# Patient Record
Sex: FEMALE | Race: WHITE | Employment: FULL TIME | URBAN - METROPOLITAN AREA
[De-identification: names, ages, dates, MRNs, and addresses within clinical notes are randomized per-mention and may not be internally consistent; named-entity substitution may affect disease eponyms.]

---

## 2017-08-16 ENCOUNTER — HOSPITAL ENCOUNTER (OUTPATIENT)
Dept: LAB | Age: 22
Discharge: HOME | End: 2017-08-16

## 2017-08-16 ENCOUNTER — HOSPITAL ENCOUNTER (EMERGENCY)
Age: 22
Discharge: HOME | End: 2017-08-16
Attending: FAMILY MEDICINE

## 2017-08-16 VITALS
SYSTOLIC BLOOD PRESSURE: 131 MMHG | OXYGEN SATURATION: 96 % | HEIGHT: 63 IN | HEART RATE: 93 BPM | RESPIRATION RATE: 20 BRPM | WEIGHT: 265 LBS | TEMPERATURE: 97.4 F | BODY MASS INDEX: 46.95 KG/M2 | DIASTOLIC BLOOD PRESSURE: 78 MMHG

## 2017-08-16 DIAGNOSIS — N89.8 VAGINAL ITCHING: Primary | ICD-10-CM

## 2017-08-16 LAB
BILIRUB UR QL: NEGATIVE
GLUCOSE UR QL STRIP.AUTO: NEGATIVE MG/DL
HCG UR QL: NEGATIVE
KETONES UR-MCNC: NEGATIVE MG/DL
LEUKOCYTE ESTERASE UR QL STRIP: ABNORMAL
NITRITE UR QL: NEGATIVE
PH UR: 6.5 [PH] (ref 5–8)
PROT UR QL: ABNORMAL MG/DL
RBC # UR STRIP: ABNORMAL /UL
SP GR UR: 1.02 (ref 1–1.03)
UROBILINOGEN UR QL: 0.2 EU/DL (ref 0.2–1)

## 2017-08-16 PROCEDURE — 87591 N.GONORRHOEAE DNA AMP PROB: CPT | Performed by: FAMILY MEDICINE

## 2017-08-16 RX ORDER — FLUCONAZOLE 150 MG/1
150 TABLET ORAL
Qty: 1 TAB | Refills: 0 | Status: SHIPPED | OUTPATIENT
Start: 2017-08-16 | End: 2017-08-16

## 2017-08-16 RX ORDER — METRONIDAZOLE 7.5 MG/G
1 GEL VAGINAL
Qty: 187.5 MG | Refills: 0 | Status: SHIPPED | OUTPATIENT
Start: 2017-08-16 | End: 2017-08-21

## 2017-08-16 NOTE — DISCHARGE INSTRUCTIONS

## 2017-08-16 NOTE — UC PROVIDER NOTE
Patient is a 24 y.o. female presenting with vaginal itching. The history is provided by the patient. Vaginal Itching    This is a new problem. The current episode started more than 2 days ago. The problem occurs daily. The problem has not changed since onset. The discharge occurs after intercourse. Vaginal discharge characteristics: no discharge. She is not pregnant. She has not missed her period. Associated symptoms include genital itching. Pertinent negatives include no fever, no abdominal pain, no dyspareunia, no dysuria, no frequency, no genital burning, no genital lesions, no perineal pain, no perineal odor and no painful intercourse. Treatments tried: otc vaginal yeast cream x 7 days. The treatment provided mild relief. Her past medical history does not include irregular periods, STD or ectopic pregnancy. Past Medical History:   Diagnosis Date    Morbid obesity ([address]     Unspecified adverse effect of anesthesia     mother states she's had \"trouble breathing after procedure\"        History reviewed. No pertinent surgical history. History reviewed. No pertinent family history. Social History     Social History    Marital status: SINGLE     Spouse name: N/A    Number of children: N/A    Years of education: N/A     Occupational History    Not on file. Social History Main Topics    Smoking status: Current Every Day Smoker     Packs/day: 1.00    Smokeless tobacco: Never Used    Alcohol use No    Drug use: No    Sexual activity: Not on file     Other Topics Concern    Not on file     Social History Narrative                ALLERGIES: Keflex [cephalexin]    Review of Systems   Constitutional: Negative for fever. Gastrointestinal: Negative for abdominal pain. Genitourinary: Negative for dyspareunia, dysuria and frequency. All other systems reviewed and are negative.       Vitals:    08/16/17 1428 08/16/17 1430   BP:  131/78   Pulse:  93   Resp:  20   Temp:  97.4 °F (36.3 °C) SpO2:  96%   Weight: 120.2 kg (265 lb)    Height: 5' 3\" (1.6 m)        Physical Exam   Constitutional: No distress. HENT:   Mouth/Throat: No oropharyngeal exudate. Eyes: No scleral icterus. Abdominal: Soft. Bowel sounds are normal. She exhibits no distension and no mass. There is no tenderness. There is no rebound and no guarding. Genitourinary:   Genitourinary Comments: deferred   Skin: No rash noted. Nursing note and vitals reviewed.       MDM     Differential Diagnosis; Clinical Impression; Plan:     CLINICAL IMPRESSION:  Vaginal itching  (primary encounter diagnosis)      DDX    Plan:    Diflucan 150 mg once  If not resolved use metronidazole vaginal cream   Amount and/or Complexity of Data Reviewed:    Review and summarize past medical records:  Yes  Risk of Significant Complications, Morbidity, and/or Mortality:   Presenting problems:  Low  Management options:  Low  Progress:   Patient progress:  Stable      Procedures

## 2017-08-18 LAB
C TRACH DNA SPEC QL NAA+PROBE: POSITIVE
N GONORRHOEA DNA SPEC QL NAA+PROBE: NEGATIVE
SAMPLE TYPE: ABNORMAL
SERVICE CMNT-IMP: ABNORMAL
SPECIMEN SOURCE: ABNORMAL

## 2017-08-18 RX ORDER — DOXYCYCLINE 100 MG/1
100 CAPSULE ORAL 2 TIMES DAILY
Qty: 20 CAP | Refills: 0 | Status: SHIPPED | OUTPATIENT
Start: 2017-08-18 | End: 2017-08-28

## 2017-08-22 RX ORDER — AZITHROMYCIN 250 MG/1
1000 TABLET, FILM COATED ORAL
Qty: 4 TAB | Refills: 0 | Status: SHIPPED | OUTPATIENT
Start: 2017-08-22 | End: 2017-08-22

## 2017-08-22 NOTE — UC NOTE
Pt called and spoke to tech, stating doxycycline causing nausea, prefers alternative. E-escribed azithromycin 1g x1.

## 2018-04-16 ENCOUNTER — APPOINTMENT (OUTPATIENT)
Dept: GENERAL RADIOLOGY | Age: 23
End: 2018-04-16
Attending: PHYSICIAN ASSISTANT
Payer: COMMERCIAL

## 2018-04-16 ENCOUNTER — HOSPITAL ENCOUNTER (EMERGENCY)
Age: 23
Discharge: HOME | End: 2018-04-16
Attending: EMERGENCY MEDICINE | Admitting: EMERGENCY MEDICINE
Payer: COMMERCIAL

## 2018-04-16 VITALS
HEART RATE: 77 BPM | BODY MASS INDEX: 49.61 KG/M2 | SYSTOLIC BLOOD PRESSURE: 103 MMHG | HEIGHT: 63 IN | RESPIRATION RATE: 20 BRPM | WEIGHT: 280 LBS | DIASTOLIC BLOOD PRESSURE: 49 MMHG | OXYGEN SATURATION: 98 % | TEMPERATURE: 98.2 F

## 2018-04-16 DIAGNOSIS — R07.9 CHEST PAIN, UNSPECIFIED TYPE: Primary | ICD-10-CM

## 2018-04-16 LAB
ALBUMIN SERPL-MCNC: 3.5 G/DL (ref 3.5–5)
ALBUMIN/GLOB SERPL: 0.9 {RATIO} (ref 1.1–2.2)
ALP SERPL-CCNC: 83 U/L (ref 45–117)
ALT SERPL-CCNC: 84 U/L (ref 12–78)
ANION GAP SERPL CALC-SCNC: 12 MMOL/L (ref 5–15)
AST SERPL-CCNC: 46 U/L (ref 15–37)
ATRIAL RATE: 84 BPM
BASOPHILS # BLD: 0 K/UL (ref 0–0.1)
BASOPHILS NFR BLD: 1 % (ref 0–1)
BILIRUB SERPL-MCNC: 0.5 MG/DL (ref 0.2–1)
BUN SERPL-MCNC: 9 MG/DL (ref 6–20)
BUN/CREAT SERPL: 13 (ref 12–20)
CALCIUM SERPL-MCNC: 9.1 MG/DL (ref 8.5–10.1)
CALCULATED P AXIS, ECG09: 29 DEGREES
CALCULATED R AXIS, ECG10: 21 DEGREES
CALCULATED T AXIS, ECG11: 11 DEGREES
CHLORIDE SERPL-SCNC: 105 MMOL/L (ref 97–108)
CO2 SERPL-SCNC: 23 MMOL/L (ref 21–32)
CREAT SERPL-MCNC: 0.71 MG/DL (ref 0.55–1.02)
D DIMER PPP FEU-MCNC: 0.28 MG/L FEU (ref 0–0.65)
DIAGNOSIS, 93000: NORMAL
DIFFERENTIAL METHOD BLD: NORMAL
EOSINOPHIL # BLD: 0.1 K/UL (ref 0–0.4)
EOSINOPHIL NFR BLD: 2 % (ref 0–7)
ERYTHROCYTE [DISTWIDTH] IN BLOOD BY AUTOMATED COUNT: 12.5 % (ref 11.5–14.5)
GLOBULIN SER CALC-MCNC: 4 G/DL (ref 2–4)
GLUCOSE SERPL-MCNC: 99 MG/DL (ref 65–100)
HCG UR QL: NEGATIVE
HCT VFR BLD AUTO: 41.1 % (ref 35–47)
HGB BLD-MCNC: 13.8 G/DL (ref 11.5–16)
IMM GRANULOCYTES # BLD: 0 K/UL (ref 0–0.04)
IMM GRANULOCYTES NFR BLD AUTO: 0 % (ref 0–0.5)
LIPASE SERPL-CCNC: 111 U/L (ref 73–393)
LYMPHOCYTES # BLD: 2.6 K/UL (ref 0.8–3.5)
LYMPHOCYTES NFR BLD: 39 % (ref 12–49)
MCH RBC QN AUTO: 29.8 PG (ref 26–34)
MCHC RBC AUTO-ENTMCNC: 33.6 G/DL (ref 30–36.5)
MCV RBC AUTO: 88.8 FL (ref 80–99)
MONOCYTES # BLD: 0.5 K/UL (ref 0–1)
MONOCYTES NFR BLD: 8 % (ref 5–13)
NEUTS SEG # BLD: 3.4 K/UL (ref 1.8–8)
NEUTS SEG NFR BLD: 51 % (ref 32–75)
NRBC # BLD: 0 K/UL (ref 0–0.01)
NRBC BLD-RTO: 0 PER 100 WBC
P-R INTERVAL, ECG05: 110 MS
PLATELET # BLD AUTO: 246 K/UL (ref 150–400)
PMV BLD AUTO: 9.5 FL (ref 8.9–12.9)
POTASSIUM SERPL-SCNC: 3.8 MMOL/L (ref 3.5–5.1)
PROT SERPL-MCNC: 7.5 G/DL (ref 6.4–8.2)
Q-T INTERVAL, ECG07: 376 MS
QRS DURATION, ECG06: 90 MS
QTC CALCULATION (BEZET), ECG08: 444 MS
RBC # BLD AUTO: 4.63 M/UL (ref 3.8–5.2)
SODIUM SERPL-SCNC: 140 MMOL/L (ref 136–145)
TROPONIN I SERPL-MCNC: <0.04 NG/ML
VENTRICULAR RATE, ECG03: 84 BPM
WBC # BLD AUTO: 6.6 K/UL (ref 3.6–11)

## 2018-04-16 PROCEDURE — 81025 URINE PREGNANCY TEST: CPT

## 2018-04-16 PROCEDURE — 74011250636 HC RX REV CODE- 250/636: Performed by: PHYSICIAN ASSISTANT

## 2018-04-16 PROCEDURE — 36415 COLL VENOUS BLD VENIPUNCTURE: CPT | Performed by: PHYSICIAN ASSISTANT

## 2018-04-16 PROCEDURE — 71046 X-RAY EXAM CHEST 2 VIEWS: CPT

## 2018-04-16 PROCEDURE — 96375 TX/PRO/DX INJ NEW DRUG ADDON: CPT

## 2018-04-16 PROCEDURE — 83690 ASSAY OF LIPASE: CPT | Performed by: PHYSICIAN ASSISTANT

## 2018-04-16 PROCEDURE — 80053 COMPREHEN METABOLIC PANEL: CPT | Performed by: PHYSICIAN ASSISTANT

## 2018-04-16 PROCEDURE — 74011250637 HC RX REV CODE- 250/637: Performed by: PHYSICIAN ASSISTANT

## 2018-04-16 PROCEDURE — 85379 FIBRIN DEGRADATION QUANT: CPT | Performed by: PHYSICIAN ASSISTANT

## 2018-04-16 PROCEDURE — 85025 COMPLETE CBC W/AUTO DIFF WBC: CPT | Performed by: PHYSICIAN ASSISTANT

## 2018-04-16 PROCEDURE — 99285 EMERGENCY DEPT VISIT HI MDM: CPT

## 2018-04-16 PROCEDURE — 96361 HYDRATE IV INFUSION ADD-ON: CPT

## 2018-04-16 PROCEDURE — 93005 ELECTROCARDIOGRAM TRACING: CPT

## 2018-04-16 PROCEDURE — 96374 THER/PROPH/DIAG INJ IV PUSH: CPT

## 2018-04-16 PROCEDURE — 84484 ASSAY OF TROPONIN QUANT: CPT | Performed by: PHYSICIAN ASSISTANT

## 2018-04-16 RX ORDER — BUTALBITAL, ACETAMINOPHEN AND CAFFEINE 300; 40; 50 MG/1; MG/1; MG/1
1 CAPSULE ORAL
Qty: 20 CAP | Refills: 0 | Status: SHIPPED | OUTPATIENT
Start: 2018-04-16

## 2018-04-16 RX ORDER — PROCHLORPERAZINE EDISYLATE 5 MG/ML
10 INJECTION INTRAMUSCULAR; INTRAVENOUS
Status: COMPLETED | OUTPATIENT
Start: 2018-04-16 | End: 2018-04-16

## 2018-04-16 RX ORDER — FAMOTIDINE 20 MG/1
20 TABLET, FILM COATED ORAL 2 TIMES DAILY
Qty: 14 TAB | Refills: 0 | Status: SHIPPED | OUTPATIENT
Start: 2018-04-16 | End: 2018-04-23

## 2018-04-16 RX ORDER — DIPHENHYDRAMINE HYDROCHLORIDE 50 MG/ML
25 INJECTION, SOLUTION INTRAMUSCULAR; INTRAVENOUS
Status: COMPLETED | OUTPATIENT
Start: 2018-04-16 | End: 2018-04-16

## 2018-04-16 RX ORDER — BUTALBITAL, ACETAMINOPHEN AND CAFFEINE 300; 40; 50 MG/1; MG/1; MG/1
1 CAPSULE ORAL
Qty: 30 CAP | Refills: 0 | Status: SHIPPED | OUTPATIENT
Start: 2018-04-16 | End: 2018-04-16

## 2018-04-16 RX ORDER — GUAIFENESIN 100 MG/5ML
162 LIQUID (ML) ORAL
Status: COMPLETED | OUTPATIENT
Start: 2018-04-16 | End: 2018-04-16

## 2018-04-16 RX ORDER — ONDANSETRON 4 MG/1
4 TABLET, ORALLY DISINTEGRATING ORAL
Status: COMPLETED | OUTPATIENT
Start: 2018-04-16 | End: 2018-04-16

## 2018-04-16 RX ORDER — FAMOTIDINE 20 MG/1
20 TABLET, FILM COATED ORAL
Status: COMPLETED | OUTPATIENT
Start: 2018-04-16 | End: 2018-04-16

## 2018-04-16 RX ADMIN — FAMOTIDINE 20 MG: 20 TABLET, FILM COATED ORAL at 13:25

## 2018-04-16 RX ADMIN — ONDANSETRON 4 MG: 4 TABLET, ORALLY DISINTEGRATING ORAL at 10:58

## 2018-04-16 RX ADMIN — PROCHLORPERAZINE EDISYLATE 10 MG: 5 INJECTION INTRAMUSCULAR; INTRAVENOUS at 11:53

## 2018-04-16 RX ADMIN — DIPHENHYDRAMINE HYDROCHLORIDE 25 MG: 50 INJECTION, SOLUTION INTRAMUSCULAR; INTRAVENOUS at 11:50

## 2018-04-16 RX ADMIN — SODIUM CHLORIDE 1000 ML: 900 INJECTION, SOLUTION INTRAVENOUS at 11:48

## 2018-04-16 RX ADMIN — ASPIRIN 81 MG 162 MG: 81 TABLET ORAL at 10:58

## 2018-04-16 NOTE — ED TRIAGE NOTES
Pt c/o having chest pain, vomiting and diarrhea since last night. Not able to keep anything down. Denies synthetic drug use.

## 2018-04-16 NOTE — DISCHARGE INSTRUCTIONS
Chest Pain: Care Instructions  Your Care Instructions    There are many things that can cause chest pain. Some are not serious and will get better on their own in a few days. But some kinds of chest pain need more testing and treatment. Your doctor may have recommended a follow-up visit in the next 8 to 12 hours. If you are not getting better, you may need more tests or treatment. Even though your doctor has released you, you still need to watch for any problems. The doctor carefully checked you, but sometimes problems can develop later. If you have new symptoms or if your symptoms do not get better, get medical care right away. If you have worse or different chest pain or pressure that lasts more than 5 minutes or you passed out (lost consciousness), call 911 or seek other emergency help right away. A medical visit is only one step in your treatment. Even if you feel better, you still need to do what your doctor recommends, such as going to all suggested follow-up appointments and taking medicines exactly as directed. This will help you recover and help prevent future problems. How can you care for yourself at home? · Rest until you feel better. · Take your medicine exactly as prescribed. Call your doctor if you think you are having a problem with your medicine. · Do not drive after taking a prescription pain medicine. When should you call for help? Call 911 if:  ? · You passed out (lost consciousness). ? · You have severe difficulty breathing. ? · You have symptoms of a heart attack. These may include:  ¨ Chest pain or pressure, or a strange feeling in your chest.  ¨ Sweating. ¨ Shortness of breath. ¨ Nausea or vomiting. ¨ Pain, pressure, or a strange feeling in your back, neck, jaw, or upper belly or in one or both shoulders or arms. ¨ Lightheadedness or sudden weakness. ¨ A fast or irregular heartbeat.   After you call 911, the  may tell you to chew 1 adult-strength or 2 to 4 low-dose aspirin. Wait for an ambulance. Do not try to drive yourself. ?Call your doctor today if:  ? · You have any trouble breathing. ? · Your chest pain gets worse. ? · You are dizzy or lightheaded, or you feel like you may faint. ? · You are not getting better as expected. ? · You are having new or different chest pain. Where can you learn more? Go to http://jeff-cristina.info/. Enter A120 in the search box to learn more about \"Chest Pain: Care Instructions. \"  Current as of: March 20, 2017  Content Version: 11.4  © 4649-4272 [employer]. Care instructions adapted under license by [employer] (which disclaims liability or warranty for this information). If you have questions about a medical condition or this instruction, always ask your healthcare professional. [address] any warranty or liability for your use of this information.

## 2018-04-16 NOTE — ED PROVIDER NOTES
HPI Comments: 19 y/o female with PMHx of obesity, presenting with complaint of chest pain. The patient reports a 3 week history of frequent headaches, but yesterday she began to notice left-sided chest pain and worsened headache, followed by onset of nausea. She had 1 episode of vomiting after dinner as well as some diarrhea. No further vomiting since last night, but she reports continued nausea only when she eats. She endorses associated shortness of breath and light-headedness, and states that her symptoms do not feel like previous episodes of anxiety. She denies recent surgeries/hospitalizations, history of malignancy, oral contraceptive use, hemoptysis, asymmetrical leg swelling or previous history of DVT/PE. No vision changes, weakness or numbness. The history is provided by the patient and a parent. Past Medical History:   Diagnosis Date    Morbid obesity ([address]     Unspecified adverse effect of anesthesia     mother states she's had \"trouble breathing after procedure\"       No past surgical history on file. No family history on file. Social History     Social History    Marital status: SINGLE     Spouse name: N/A    Number of children: N/A    Years of education: N/A     Occupational History    Not on file. Social History Main Topics    Smoking status: Current Every Day Smoker     Packs/day: 1.00    Smokeless tobacco: Never Used    Alcohol use No    Drug use: No    Sexual activity: Not on file     Other Topics Concern    Not on file     Social History Narrative         ALLERGIES: Keflex [cephalexin]    Review of Systems   Constitutional: Negative for chills and fever. Eyes: Negative for visual disturbance. Respiratory: Positive for shortness of breath. Negative for cough. Cardiovascular: Positive for chest pain. Gastrointestinal: Positive for diarrhea, nausea and vomiting. Negative for abdominal pain. Musculoskeletal: Negative for myalgias.    Skin: Negative for wound.   Neurological: Positive for light-headedness and headaches. Negative for syncope, weakness and numbness. All other systems reviewed and are negative. Vitals:    04/16/18 0934   BP: 127/81   Pulse: 88   Resp: 20   Temp: 98.2 °F (36.8 °C)   SpO2: 98%   Weight: 127 kg (280 lb)   Height: 5' 3\" (1.6 m)            Physical Exam   Constitutional: She is oriented to person, place, and time. She appears well-developed and well-nourished. No distress. HENT:   Head: Normocephalic and atraumatic. Eyes: Conjunctivae and EOM are normal.   Neck: Normal range of motion. Neck supple. Cardiovascular: Normal rate, regular rhythm and normal heart sounds. Pulmonary/Chest: Effort normal and breath sounds normal.   Tenderness to palpation of midline and left-sided anterior chest wall. Abdominal: Soft. She exhibits no distension. There is tenderness (mild RUQ). There is no rebound and no guarding. Musculoskeletal: Normal range of motion. Left trapezius tender to palpation. Neurological: She is alert and oriented to person, place, and time. No slurred speech or facial droop. Moving all extremities symmetrically. Skin: Skin is warm and dry. She is not diaphoretic. Nursing note and vitals reviewed. MDM  Number of Diagnoses or Management Options  Chest pain, unspecified type:      Amount and/or Complexity of Data Reviewed  Clinical lab tests: ordered and reviewed  Tests in the radiology section of CPT®: ordered and reviewed  Discuss the patient with other providers: yes (Dr. Montse Dash, ED attending)  Independent visualization of images, tracings, or specimens: yes (CXR)    Patient Progress  Patient progress: stable        ED Course       Procedures      19 y/o female with PMHx of obesity, presenting with complaint of chest pain. History and exam concerning for possible ACS, arrhythmia, PE, viral gastroenteritis, pancreatitis, musculoskeletal chest pain, anxiety, migraines, tension headaches.  Low clinical suspicion for aortic aneurysm/dissection, ICH, intracranial mass, normal pressure hydrocephalus. ED EKG interpretation:  Rhythm: normal sinus rhythm; and regular . Rate (approx.): 84; Axis: normal; ST/T wave: normal.  Interpreted by Dr. Reyna Pagan, 9:33 AM     CXR, read by radiology and independently visualized and interpreted by myself, reveals no evidence of acute cardiopulmonary abnormalities. CBC, CMP, lipase obtained and are unremarkable, troponin negative, d-dimer negative. Headache slightly improved after headache cocktail. Safe for discharge home with Rx for fioricet and pepcid. The patient was given resources to follow up with neurology for evaluation of headaches, and to establish care with a new PCP. Strict ED return precautions discussed and provided in writing at time of discharge. The patient verbalized understanding and agreement with this plan.

## 2018-04-27 ENCOUNTER — OFFICE VISIT (OUTPATIENT)
Dept: NEUROLOGY | Age: 23
End: 2018-04-27

## 2018-04-27 VITALS
DIASTOLIC BLOOD PRESSURE: 70 MMHG | HEIGHT: 63 IN | BODY MASS INDEX: 50.14 KG/M2 | HEART RATE: 60 BPM | SYSTOLIC BLOOD PRESSURE: 132 MMHG | WEIGHT: 283 LBS | OXYGEN SATURATION: 98 %

## 2018-04-27 DIAGNOSIS — E66.09 OBESITY DUE TO EXCESS CALORIES WITHOUT SERIOUS COMORBIDITY, UNSPECIFIED CLASSIFICATION: ICD-10-CM

## 2018-04-27 DIAGNOSIS — F32.1 CURRENT MODERATE EPISODE OF MAJOR DEPRESSIVE DISORDER WITHOUT PRIOR EPISODE (HCC): ICD-10-CM

## 2018-04-27 DIAGNOSIS — R51.9 NEW ONSET HEADACHE: Primary | ICD-10-CM

## 2018-04-27 PROBLEM — E66.01 OBESITY, MORBID (HCC): Status: ACTIVE | Noted: 2018-04-27

## 2018-04-27 RX ORDER — SUMATRIPTAN 50 MG/1
50 TABLET, FILM COATED ORAL
Qty: 9 TAB | Refills: 5 | Status: SHIPPED | OUTPATIENT
Start: 2018-04-27

## 2018-04-27 RX ORDER — BUTALBITAL, ACETAMINOPHEN AND CAFFEINE 50; 325; 40 MG/1; MG/1; MG/1
1 TABLET ORAL
Qty: 40 TAB | Refills: 5 | Status: SHIPPED | OUTPATIENT
Start: 2018-04-27

## 2018-04-27 RX ORDER — VENLAFAXINE HYDROCHLORIDE 37.5 MG/1
CAPSULE, EXTENDED RELEASE ORAL
Qty: 60 CAP | Refills: 5 | Status: SHIPPED | OUTPATIENT
Start: 2018-04-27

## 2018-04-27 NOTE — MR AVS SNAPSHOT
[address] [facility], 
[address], [address] [place] [id number] Patient: Cody Rivera MRN: [id number] :1995 Visit Information Date & Time Provider Department Dept. Phone Encounter #  
 2018 11:00 AM Mayur Sesay MD [facility]rology [facility] at [facility] [phone] [id number] Your Appointments 2018 10:20 AM  
Follow Up with Mayur Sesay MD  
[facility] Appt Note: follow up migraines $ 0 18  
 [address] [facility], 
[address] [place] [address], [address] [phone]  
[address] [facility], [address] [place] [address], [address] [place] [address] St [phone] Upcoming Health Maintenance Date Due  
 HPV Age 9Y-34Y (1 of 1 - Female 3 Dose Series) 2005 Pneumococcal 19-64 Medium Risk (1 of 1 - PPSV23) 2013 DTaP/Tdap/Td series (1 - Tdap) 2015 PAP AKA CERVICAL CYTOLOGY 2015 Influenza Age 5 to Adult 2018 Allergies as of 2018  Review Complete On: 2018 By: Marcel Vo LPN Severity Noted Reaction Type Reactions Keflex [Cephalexin]  2013   Intolerance Nausea and Vomiting Current Immunizations  Never Reviewed No immunizations on file. Not reviewed this visit You Were Diagnosed With   
  
 Codes Comments New onset headache    -  Primary ICD-10-CM: R46 ICD-9-CM: 784.0 Obesity due to excess calories without serious comorbidity, unspecified classification     ICD-10-CM: E66.09 
ICD-9-CM: 278.00 Current moderate episode of major depressive disorder without prior episode ([facility]     ICD-10-CM: F32.1 ICD-9-CM: 296.22 Vitals BP Pulse Height(growth percentile) Weight(growth percentile) SpO2 BMI  
 132/70 60 5' 3\" (1.6 m) 283 lb (128.4 kg) 98% 50.13 kg/m2 OB Status Smoking Status Implant Current Every Day Smoker Vitals History BMI and BSA Data Body Mass Index Body Surface Area  
 50.13 kg/m 2 2.39 m 2 Preferred Pharmacy Pharmacy Name Phone [facility] [address] [place] [address], [address] [place] [address]. [phone] Your Updated Medication List  
  
   
This list is accurate as of 18 11:39 AM.  Always use your most recent med list.  
  
  
  
  
 * butalbital-acetaminophen-caff -40 mg per capsule Commonly known as:  [employer] Take 1 Cap by mouth every six (6) hours as needed for Headache. * butalbital-acetaminophen-caffeine -40 mg per tablet Commonly known as:  Kelin Mitchell Take 1 Tab by mouth every six (6) hours as needed for Pain. This medication is only to be filled in one month intervals SUMAtriptan 50 mg tablet Commonly known as:  IMITREX Take 1 Tab by mouth once as needed for Migraine for up to 1 dose. venlafaxine-SR 37.5 mg capsule Commonly known as:  EFFEXOR-XR  
1 p.o. every morning for 7 days and then 2 p.o. every morning  Indications: major depressive disorder * Notice: This list has 2 medication(s) that are the same as other medications prescribed for you. Read the directions carefully, and ask your doctor or other care provider to review them with you. Prescriptions Sent to Pharmacy Refills  
 venlafaxine-SR (EFFEXOR-XR) 37.5 mg capsule 5 Si p.o. every morning for 7 days and then 2 p.o. every morning  Indications: major depressive disorder Class: Normal  
 Pharmacy: [facility] [address] Ph #: [phone] SUMAtriptan (IMITREX) 50 mg tablet 5 Sig: Take 1 Tab by mouth once as needed for Migraine for up to 1 dose. Class: Normal  
 Pharmacy: [facility] [address], [address] [place] [address] RD. Ph #: [phone]  Route: Oral  
 butalbital-acetaminophen-caffeine (FIORICET, ESGIC) -40 mg per tablet 5  
 Sig: Take 1 Tab by mouth every six (6) hours as needed for Pain. This medication is only to be filled in one month intervals Class: Normal  
 Pharmacy: [address] [place], [address] [facility] [address]  #: [phone] Route: Oral  
  
To-Do List   
 05/05/2018 Imaging:  MRI BRAIN W WO CONT Patient Instructions Office Policies Phone calls/patient messages: Please allow up to 24 hours for someone in the office to contact you about your call or message. Be mindful your provider may be out of the office or your message may require further review. We encourage you to use Yesmywine for your messages as this is a faster, more efficient way to communicate with our office Medication Refills: 
 Prescription medications require 48 business hours to process. We encourage you to use Yesmywine for your refills. For controlled medications: Please allow 72 business hours to process. Certain medications may require you to  a written prescription at our office. NO narcotic/controlled medications will be prescribed after 4pm Monday through Friday or on weekends Form/Paperwork Completion: 
 Please note there is a $25 fee for all paperwork completed by our providers. We ask that you allow 7-14 business days. Pre-payment is due prior to picking up/faxing the completed form. You may also download your forms to Yesmywine to have your doctor print off. Introducing [facility]! Dear Zane Torres: Thank you for requesting a Yesmywine account. Our records indicate that you already have an active Yesmywine account. You can access your account anytime at [email or web address] Did you know that you can access your hospital and ER discharge instructions at any time in Yesmywine? You can also review all of your test results from your hospital stay or ER visit. Additional Information If you have questions, please visit the Frequently Asked Questions section of the [email or web address] website at [email or web address] Remember, [employer] is NOT to be used for urgent needs. For medical emergencies, dial 911. Now available from your iPhone and Android! Please provide this summary of care documentation to your next provider. Your primary care clinician is listed as NONE. If you have any questions after today's visit, please call [phone].

## 2018-04-27 NOTE — PATIENT INSTRUCTIONS
Office Policies       Phone calls/patient messages:   Please allow up to 24 hours for someone in the office to contact you about your call or message. Be mindful your provider may be out of the office or your message may require further review. We encourage you to use Chat& (ChatAnd) for your messages as this is a faster, more efficient way to communicate with our office         Medication Refills:   Prescription medications require 48 business hours to process. We encourage you to use Chat& (ChatAnd) for your refills. For controlled medications: Please allow 72 business hours to process. Certain medications may require you to  a written prescription at our office. NO narcotic/controlled medications will be prescribed after 4pm Monday through Friday or on weekends     Form/Paperwork Completion:   Please note there is a $25 fee for all paperwork completed by our providers. We ask that you allow 7-14 business days. Pre-payment is due prior to picking up/faxing the completed form. You may also download your forms to Chat& (ChatAnd) to have your doctor print off.

## 2018-04-27 NOTE — PROGRESS NOTES
HISTORY OF PRESENT ILLNESS  Santiago Mike is a 20 y.o. female. HPI Comments: This patient is a 50-year-old right-handed  female who comes in today with severe headaches. She says for the last month she has had severe headaches that intermittently can be associated with visual loss. There is photophobia with this. There can be some nausea, no actual vomiting. She also states that she believes she has become depressed over the last month. She does not have a significant history of psychiatric disease. She is otherwise a healthy person. She is here with her mother today. She is employed and works as a  to a wealthy businessman. She is on Nexplanon and therefore cannot be pregnant. She has a strong family history of migraine in an aunt grandfather mother. I cannot get a family history of depression. Headache   The history is provided by the patient and relative (Mother). This is a chronic problem. Associated symptoms include headaches. Review of Systems   Constitutional:        Systems is positive for anxiety, occasional chest pain, depression, fatigue, frequent headaches, occasional hives, memory loss, nausea and vomiting occasionally with a headache ringing in the ears and ear pain at times. Visual disturbance associated with the headache and weight gain. Complete review of systems done all their is negative   Neurological: Positive for headaches. Current Outpatient Prescriptions on File Prior to Visit   Medication Sig Dispense Refill    butalbital-acetaminophen-caff (FIORICET) -40 mg per capsule Take 1 Cap by mouth every six (6) hours as needed for Headache. 20 Cap 0     No current facility-administered medications on file prior to visit.       Past Medical History:   Diagnosis Date    Headache     Morbid obesity ([address]     Unspecified adverse effect of anesthesia     mother states she's had \"trouble breathing after procedure\"     No family history on file.  Social History   Substance Use Topics    Smoking status: Current Every Day Smoker     Packs/day: 1.00    Smokeless tobacco: Never Used    Alcohol use No     /70  Pulse 60  Ht 5' 3\" (1.6 m)  Wt 283 lb (128.4 kg)  SpO2 98%  BMI 50.13 kg/m2      Physical Exam  Constitutional: Oriented to person, place, and time, appears well-developed and well-nourished. No distress. HENT:   Head: Normocephalic and atraumatic. Mouth/Throat: Oropharynx is clear and moist. No oropharyngeal exudate. Eyes: Conjunctivae and EOM are normal. Pupils are equal, round, and reactive to light. No scleral icterus. Neck: Normal range of motion. Neck supple. No thyromegaly present. Cardiovascular: Normal rate, regular rhythm and normal heart sounds. Musculoskeletal: Normal range of motion, exhibits no edema, tenderness or deformity. Lymphadenopathy: no cervical adenopathy. Neurological: Alert and oriented to person, place, and time. Normal strength and normal reflexes. Displays no atrophy and no tremor. No cranial nerve deficit or sensory deficit. Exhibits normal muscle tone. Displays a negative Romberg sign, no seizure activity. Coordination normal, gait normal.   No Babinski's sign on the right side. No Babinski's sign on the left side. Speech, language and mentation are normal  Visual fields are full to confrontation, funduscopic exam reveals flat discs, the retina and vasculature are normal   Skin: Skin is warm and dry. No rash noted, not diaphoretic. No erythema. Psychiatric: Normal mood and affect,  behavior is normal. Judgment and thought content normal.   Vitals reviewed. ASSESSMENT and PLAN  ACUTE STATUS MIGRAINOSUS  I believe this patient has an acute migrainous exacerbation probably secondary to depression. I will treat her with Imitrex and Fioricet, I have given her the directions on how often she can take it.   I also believe she is acutely depressed and I am going to start her on Effexor 37.5 mg a day increasing after a week to 37.5 mg twice a day. I will use the XR version. I explained the patient that her headaches are not going to go away immediately, once we get her depression under control expect him to improve significantly but she may have to put up with persistent headache for quite some time. I am going to set her up for an MRI scan of her brain with contrast, I would like to make sure that we are not dealing with any abnormality in a patient who does not have a history of migraine. I will use the open scanner as the patient is obese. MORBID OBESITY  At this point this is an unrelated comorbidity. This note will not be viewable in [address]

## 2018-05-09 ENCOUNTER — HOSPITAL ENCOUNTER (OUTPATIENT)
Dept: MRI IMAGING | Age: 23
Discharge: HOME | End: 2018-05-09
Attending: PSYCHIATRY & NEUROLOGY
Payer: COMMERCIAL

## 2018-05-09 DIAGNOSIS — F32.1 CURRENT MODERATE EPISODE OF MAJOR DEPRESSIVE DISORDER WITHOUT PRIOR EPISODE (HCC): ICD-10-CM

## 2018-05-09 DIAGNOSIS — R51.9 NEW ONSET HEADACHE: ICD-10-CM

## 2018-05-09 DIAGNOSIS — E66.09 OBESITY DUE TO EXCESS CALORIES WITHOUT SERIOUS COMORBIDITY, UNSPECIFIED CLASSIFICATION: ICD-10-CM

## 2018-05-09 PROCEDURE — 74011250636 HC RX REV CODE- 250/636: Performed by: PSYCHIATRY & NEUROLOGY

## 2018-05-09 PROCEDURE — A9576 INJ PROHANCE MULTIPACK: HCPCS | Performed by: PSYCHIATRY & NEUROLOGY

## 2018-05-09 PROCEDURE — 70553 MRI BRAIN STEM W/O & W/DYE: CPT

## 2018-05-09 RX ADMIN — GADOTERIDOL 20 ML: 279.3 INJECTION, SOLUTION INTRAVENOUS at 10:12

## 2020-05-08 ENCOUNTER — OFFICE VISIT (OUTPATIENT)
Dept: SURGERY | Age: 25
End: 2020-05-08

## 2020-05-08 VITALS
WEIGHT: 279 LBS | OXYGEN SATURATION: 97 % | SYSTOLIC BLOOD PRESSURE: 132 MMHG | HEIGHT: 63 IN | TEMPERATURE: 98.5 F | BODY MASS INDEX: 49.43 KG/M2 | DIASTOLIC BLOOD PRESSURE: 68 MMHG | HEART RATE: 92 BPM

## 2020-05-08 DIAGNOSIS — Z97.5 NEXPLANON IN PLACE: Primary | ICD-10-CM

## 2020-05-08 RX ORDER — AMOXICILLIN 500 MG/1
500 CAPSULE ORAL 2 TIMES DAILY
Qty: 14 CAP | Refills: 0 | Status: SHIPPED | OUTPATIENT
Start: 2020-05-08 | End: 2020-05-15

## 2020-05-08 NOTE — PROGRESS NOTES
Chief Complaint   Patient presents with    New Patient     seen at the request of Masha Clipp eval removal nexplanon      Discussed advanced directive. Patient states that she does  have an advanced directive.

## 2020-05-08 NOTE — PROGRESS NOTES
To:  Moises Ospina MD    From: Suma Cheema MD    Thank you for sending Chintan Kelley to see us. Please note that this dictation was completed with Fo[employer], the computer voice recognition software. Quite often unanticipated grammatical, syntax, homophones, and other interpretive errors are inadvertently transcribed by the software. Please disregard these errors. Please excuse any errors that have escaped final proofreading. Encounter Date: 5/8/2020  History and Physical    Assessment:   Retained contraception implant left upper arm. Body mass index is 49.42 kg/m². Plan:   I recommended US guided removal.    Risks including bleeding and infection were explained to the patient. The patient expressed understanding of the risks, and all questions were answered to the patient's satisfaction. HPI:   Chintan Kelley is a 21 y.o. female who is seen for retained FB. Had nexplanon partly removed Tuesday but it broke. Past Medical History:   Diagnosis Date    Anxiety     Depression     Headache     Morbid obesity ([address]     Unspecified adverse effect of anesthesia     mother states she's had \"trouble breathing after procedure\"     History reviewed. No pertinent surgical history. Family History   Problem Relation Age of Onset    Hypertension Mother     Diabetes Mother      Social History     Tobacco Use    Smoking status: Current Every Day Smoker     Packs/day: 1.00    Smokeless tobacco: Never Used   Substance Use Topics    Alcohol use: No      Current Outpatient Medications   Medication Sig    amoxicillin (AMOXIL) 500 mg capsule Take 1 Cap by mouth two (2) times a day for 7 days.  venlafaxine-SR (EFFEXOR-XR) 37.5 mg capsule 1 p.o. every morning for 7 days and then 2 p.o. every morning  Indications: major depressive disorder    SUMAtriptan (IMITREX) 50 mg tablet Take 1 Tab by mouth once as needed for Migraine for up to 1 dose.     butalbital-acetaminophen-caffeine (FIORICET, ESGIC) -40 mg per tablet Take 1 Tab by mouth every six (6) hours as needed for Pain. This medication is only to be filled in one month intervals    butalbital-acetaminophen-caff (FIORICET) -40 mg per capsule Take 1 Cap by mouth every six (6) hours as needed for Headache. No current facility-administered medications for this visit. Allergies: Allergies   Allergen Reactions    Keflex [Cephalexin] Nausea and Vomiting       Review of Systems:  10 systems reviewed. See scanned sheet in \"Media\" section. See HPI for pertinent positives and negatives. Objective:     Visit Vitals  /68   Pulse 92   Temp 98.5 °F (36.9 °C)   Ht 5' 3\" (1.6 m)   Wt 126.6 kg (279 lb)   SpO2 97%   BMI 49.42 kg/m²       Physical Exam:  General appearance  Alert, cooperative, no distress, appears stated age   [de-identified] Anicteric           Lungs   Clear to auscultation bilaterally   Heart  Regular rate and rhythm. Extremities no cyanosis or edema  Left upper arm with 5mm incision and bruising. On US the implant lies proximal to this. Pulses 2+ right radial       Lymph nodes No palpable axillary LAD. Neurologic Without overt sensory or motor deficit     Procedure Note    Procedure Date: 5/8/2020    Pre-operative diagnosis:  above  Post-operative diagnosis:  above  Procedure:  Removal; of FB     Time out at performed by me (see consent form):  * Patient was identified by name and date of birth   * Agreement on procedure being performed was verified  * Risks and Benefits explained to the patient  * Procedure site verified and marked as necessary  * Patient was positioned for comfort  * Consent was signed and verified    Anesthesia: Local    Procedure Details:  US was used to localize the FB then the area was prepped and draped in the usual manner. Local anesthesia was infiltrated into the skin and soft tissues. A longitudinal incision was made proximal tot he the prior.   This was taken down into subcutaneous tissues with blunt and sharp dissection and the FB found and removed. The cavity was irrigated with saline. The incision was closed with a 3-0 Nylion. A sterile dressing was then applied. Estimated Blood Loss:  minimal    Disposition:  Procedure well tolerated. Post-procedure pain scale: 0/10.   Oral abx and RTC 1 week for suture removal      Signed By: Yaneth Baker MD

## 2020-05-08 NOTE — Clinical Note
5/8/20 Patient: Toña Shaffer YOB: 1995 Date of Visit: 5/8/2020 Provider Unknown, MD 
Patient Not Available To Ask 
VIA Dear Provider Yancy Kennedy MD, Thank you for referring Ms. Toña Shaffer to [address] for evaluation. My notes for this consultation are attached. If you have questions, please do not hesitate to call me. I look forward to following your patient along with you.  
 
 
Sincerely, 
 
Teresa Colon MD

## 2020-05-19 ENCOUNTER — OFFICE VISIT (OUTPATIENT)
Dept: SURGERY | Age: 25
End: 2020-05-19

## 2020-05-19 VITALS
WEIGHT: 279 LBS | SYSTOLIC BLOOD PRESSURE: 127 MMHG | DIASTOLIC BLOOD PRESSURE: 89 MMHG | BODY MASS INDEX: 49.43 KG/M2 | TEMPERATURE: 97.7 F | OXYGEN SATURATION: 97 % | HEIGHT: 63 IN | HEART RATE: 101 BPM

## 2020-05-19 DIAGNOSIS — Z97.5 NEXPLANON IN PLACE: Primary | ICD-10-CM

## 2020-05-19 NOTE — Clinical Note
6/1/20 Patient: Holly Horner YOB: 1995 Date of Visit: 5/19/2020 Provider Unknown, MD 
Patient Not Available To Ask 
VIA Dear Provider See Urena MD, Thank you for referring Ms. Holly Horner to [address] for evaluation. My notes for this consultation are attached. If you have questions, please do not hesitate to call me. I look forward to following your patient along with you.  
 
 
Sincerely, 
 
Ludivina Bates MD

## 2020-05-19 NOTE — PROGRESS NOTES
Chief Complaint   Patient presents with    Follow-up     s/p US guided removal nexplanon 05/08/2020     1. Have you been to the ER, urgent care clinic since your last visit? Hospitalized since your last visit? No    2. Have you seen or consulted any other health care providers outside of the [address] [place] [address] since your last visit? Include any pap smears or colon screening.  No

## 2020-06-01 NOTE — PROGRESS NOTES
Encounter Date: 5/19/2020    Subjective:      Ese Dominguez is a 21 y.o. female presents for postop care. The patient has no complaints. Objective:     General:  alert, cooperative, no distress, appears stated age   Incision:  Well healed. Assessment:     S/p removal of left upper arm implant  Doing well postoperatively. Plan:     Sutures removed today. Follow-up only as needed. Told to call for any concerns.       Caryn Whitehead MD

## 2022-03-19 PROBLEM — E66.01 OBESITY, MORBID: Status: ACTIVE | Noted: 2018-04-27

## 2024-10-24 ENCOUNTER — OFFICE VISIT (OUTPATIENT)
Age: 29
End: 2024-10-24

## 2024-10-24 VITALS
HEART RATE: 96 BPM | TEMPERATURE: 97.8 F | WEIGHT: 289 LBS | OXYGEN SATURATION: 97 % | DIASTOLIC BLOOD PRESSURE: 88 MMHG | SYSTOLIC BLOOD PRESSURE: 120 MMHG | RESPIRATION RATE: 16 BRPM

## 2024-10-24 DIAGNOSIS — J40 BRONCHITIS: Primary | ICD-10-CM

## 2024-10-24 RX ORDER — AZITHROMYCIN 250 MG/1
TABLET, FILM COATED ORAL
Qty: 6 TABLET | Refills: 0 | Status: SHIPPED | OUTPATIENT
Start: 2024-10-24 | End: 2024-11-03

## 2024-10-24 RX ORDER — PREDNISONE 20 MG/1
40 TABLET ORAL DAILY
Qty: 10 TABLET | Refills: 0 | Status: SHIPPED | OUTPATIENT
Start: 2024-10-24 | End: 2024-10-29

## 2024-10-24 RX ORDER — ALBUTEROL SULFATE 90 UG/1
2 INHALANT RESPIRATORY (INHALATION) 4 TIMES DAILY PRN
Qty: 18 G | Refills: 0 | Status: SHIPPED | OUTPATIENT
Start: 2024-10-24

## 2024-10-24 NOTE — PROGRESS NOTES
Genie Manjarrez (:  1995) is a 28 y.o. female,New patient, here for evaluation of the following chief complaint(s):  Headache, Congestion, and Ear Pain (Stuffy nose for 3 days)      Assessment & Plan :  Visit Diagnoses and Associated Orders       Bronchitis    -  Primary    azithromycin (ZITHROMAX) 250 MG tablet [24810]      predniSONE (DELTASONE) 20 MG tablet [[id number]]      albuterol sulfate HFA (VENTOLIN HFA) 108 (90 Base) MCG/ACT inhaler [70049]                   Symptoms consistent with acute bacterial bronchitis  Vital signs are stable, no shortness of breath or red flag symptoms to warrant further evaluation in ED at this time  Will treat with Azithromycin due to prolonged duration and failure to improve with conservative treatments  Albuterol inhaler every 4-6 hours as needed  Prednisone 40mg daily x5 days  Lots of fluids, plenty of rest  Hot tea with honey, throat lozenges  Follow up with PCP if symptoms persist or worsen  Go to ED if you develop any shortness of breath, chest pain or if you are unable to tolerate food or fluids       Subjective :    Headache  Ear Pain   Associated symptoms include headaches.        28 y.o. female presents with symptoms of 3 days of worsening cough, chest congestion and chest tightness.  She states she was born a premature infant and is prone to lung infections.  She states about once per year she will develop this and will require antibiotics and treatment for bronchitis.  Denies fevers, chills, body aches or fatigue.  She does report some sinus congestion and pressure with postnasal drip and scratchy throat.  Additionally, she does report some bilateral ear pain.  Coughing is productive of a mostly clear-colored sputum currently.  Denies any significant shortness of breath but does report some wheezing and chest tightness.  Denies chest or abdominal pain, no nausea, vomiting or diarrhea.  She is taking multiple over-the-counter medications without any significant

## 2024-10-24 NOTE — PATIENT INSTRUCTIONS
Symptoms consistent with acute bacterial bronchitis  Vital signs are stable, no shortness of breath or red flag symptoms to warrant further evaluation in ED at this time  Will treat with Azithromycin due to prolonged duration and failure to improve with conservative treatments  Albuterol inhaler every 4-6 hours as needed  Prednisone 40mg daily x5 days  Lots of fluids, plenty of rest  Hot tea with honey, throat lozenges  Follow up with PCP if symptoms persist or worsen  Go to ED if you develop any shortness of breath, chest pain or if you are unable to tolerate food or fluids